# Patient Record
Sex: FEMALE | Employment: FULL TIME | URBAN - METROPOLITAN AREA
[De-identification: names, ages, dates, MRNs, and addresses within clinical notes are randomized per-mention and may not be internally consistent; named-entity substitution may affect disease eponyms.]

---

## 2022-04-13 ENCOUNTER — HOSPITAL ENCOUNTER (OUTPATIENT)
Dept: PHYSICAL THERAPY | Age: 32
Discharge: HOME | End: 2022-04-13
Payer: COMMERCIAL

## 2022-04-13 PROCEDURE — 97530 THERAPEUTIC ACTIVITIES: CPT

## 2022-04-13 PROCEDURE — 97161 PT EVAL LOW COMPLEX 20 MIN: CPT

## 2022-04-13 NOTE — PROGRESS NOTES
PF Daily Treatment Note  Patient Name: Uyen Frankel  Date:2022  []  Patient  Verified  Insurance:Payor: Fanta Estes / Plan: [facility] [address] / Product Type: PPO /   In time:5:15  Out time: 5:52  Total Treatment Time (min): 37  Total Timed Codes (min): 23  1:1 Treatment Time ( only): 40   Visit #: 1 of -    Treatment Area: [x] Pelvic Floor     [] Other:  SUBJECTIVE  Pain Level (0-10 scale): 3/10  Any medication changes, allergies to medications, adverse drug reactions, diagnosis change, or new procedure performed?: [x] No    [] Yes (see summary sheet for update)    Ms. Uyen Frankel is a 35 y/o, F who present with c/o stress UI, PFD/pelvic pain. Pain/problem started after childbirths ( & ). Pt reports prolonged childbirths and significant leakage, PF pain right after childbirth. Pt reports more of a pressure, some episodes of sharp pain duirng ovulation time. Pt reports daily leakage post voiding, couples times a day during the time of ovulation. Min-mod leakage with exercise/lifiting. Pt had history of constipation, straining and hemorrhoid. Hemorrhoid aggravated with childbirth. She denies any bowel problem or pain with sexual relations at this time. Pt also notes some weakness with TA during exercises. Pelvic Floor Dysfunction Evaluation    Musculoskeletal Screen:    Skin Integrity:  [] Healthy [] Red  [] Labia Atrophy [] Fragile    Sensation: [] Intact [] Diminished:    Muscle Bulk: [] Symmetrical  [] Well-developed [] Atrophied:  []L   []R   []B    Prolapse: [] Cystocele:   [] Rectocele:    PERF Score (Performance/Endurance/Repetitions/Flicks)   P: E: R: F: Total:    Patient has failed previous pelvic floor muscle training?   [] Yes    [] No    EMG Evaluation:  [] N/A [] Deferred secondary to:    Channel A: Electrode type:  [] Internal    [] Surface    [] Vaginal    [] Rectal  Channel B: Electrode location:    Baseline Resting Tone (1 minute)  Channel A (microvolts): Quality: [] Normal [] Irradic [] Elevated  Channel B (microvolts): Slow Twitch: (10 second hold, 20 second rest)  Channel A (microvolts): Quality:[] Quick/slow rise [] Low net rise  Net rise (microvolts):   [] Slow Relaxation [] Incoordination       [] Unable to contract [] Fatigues at (sec):       [] Elevated baseline between contractions    Channel B (microvolts): Use of Accessory Muscles: [] Minimal  Net rise (microvolts):   [] Moderate  [] Excessive       [] No use of accessory muscles    Fast Twitch (3 second hold, 10 second rest)  Channel A (microvolts): Quality:[] Quick/slow rise [] Low net rise  Net rise (microvolts):   [] Slow Relaxation [] Incoordination       [] Unable to contract [] Fatigues at (sec):       [] Elevated baseline between contractions    Channel B (microvolts): Use of Accessory Muscles: [] Minimal  Net rise (microvolts):   [] Moderate  [] Excessive       [] No use of accessory muscles    Optional Tests:  Lower abdominal strength: /5    Comments/Additional Tests:      OBJECTIVE    14 min eval      23 min Therapeutic Activity:  []  See flow sheet :Pt edu within scope of practice on prognosis, POC, PFM anatomy/physiology, HEP review, healthy bowel & bladder function. Rationale: Increase pelvic floor muscle strength, Improve quality of pelvic floor contractions, Decrease resting tone of the pelvic floor, Increase tissue extensibility of the pelvic floor muscles, Increase core strength, Inhibit abnormal muscle activity and Improve lumbosacral and coccygeal mobility in order to Increase urinary continence, Improve ability to undergo a gynecological exam and Improve ability to perform ADLs.               min Patient Education: [x] Review HEP    [] Progressed/Changed HEP based on:   [] positioning   [] body mechanics   [] transfers   [] heat/ice application        Other Objective/Functional Measures:   []baseline resting tone: []slow twitch mms   []fast twitch mms      Pain Level (0-10 scale) post treatment: 0/10    ASSESSMENT/Changes in Function: see POC please    []  Decrease # of leaks   [] No change []  Improving [] Resolved     []  Decrease hypertonus [] No change []  Improving [] Resolved     []  Increase void interval [] No change []  Improving [] Resolved     []  Increase PF strength [] No change []  Improving [] Resolved     []  Increase PF endurance [] No change []  Improving [] Resolved     []  Increase endurance [] No change []  Improving [] Resolved     []  Decrease # of pads [] No change []  Improving [] Resolved     []  Decrease pain [] No change []  Improving [] Resolved       Patient will continue to benefit from skilled PT services to modify and progress therapeutic interventions, address functional mobility deficits, address ROM deficits, address strength deficits, analyze and address soft tissue restrictions, analyze and cue movement patterns, analyze and modify body mechanics/ergonomics, assess and modify postural abnormalities, address imbalance/dizziness and instruct in home and community integration to attain remaining goals.      [x]  See Plan of Care         PLAN  [x]  Upgrade activities as tolerated     [x]  Continue plan of care  []  Update interventions per flow sheet       []  Discharge due to:_  []  Other:_        Ally Purpura 4/13/2022  9:42 AM

## 2022-04-13 NOTE — PROGRESS NOTES
[facility] [employer] [facility]  [address] [place] [address]  [phone] fax    Plan of Care/ Statement of Necessity for Physical Therapy Services    Patient name: Sea Chicas Start of Care: 2022   Referral source: Shakeel Gonzalez DO : 1990    Medical Diagnosis: Pelvic and perineal pain [R10.2]  Stress incontinence (female) (male) [N39.3]  Payor: BLUE CROSS / Plan: [facility] [address] / Product Type: PPO /  Onset Date: aggravated several months ago    Treatment Diagnosis: PFD, stress UI   Prior Hospitalization: see medical history Provider#: [id number]   Medications: Verified on Patient summary List    Comorbidities: depression   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 Assessment/ beard information: Ms. Sea Chicas is a 35 y/o, F who present with c/o stress UI, PFD/pelvic pain. Pain/problem started after childbirths ( & 2018). Pt reports prolonged childbirths and significant leakage, PF pain right after childbirth. Pt reports more of a pressure, some episodes of sharp pain duirng ovulation/menstrual time. Pt reports daily leakage post voiding, couples times a day during the time of ovulation. Min-mod leakage with exercise/lifiting weight. Pt had history of constipation, straining and hemorrhoid. She denies any bowel problem or pain with sexual relations at this time. Pt also notes some weakness with TA during exercises.  Internal assessment held until next visit per patient request. Patient may benefit from physical therapy to address the above limitations to improve her QOL.     Evaluation Complexity History MEDIUM  Complexity : 1-2 comorbidities / personal factors will impact the outcome/ POC ; Examination LOW Complexity : 1-2 Standardized tests and measures addressing body structure, function, activity limitation and / or participation in recreation  ;Presentation LOW Complexity : Stable, uncomplicated  ;Clinical Decision Making MEDIUM Complexity : FOTO score of 26-74  Overall Complexity Rating: LOW     Problem List: Pelvic pain/dysfunction, Decreased pelvic floor mm awareness, Decreased pelvic floor mm strength, Use of accessory muscles, Improper voiding habits, Hypertonus of pelvic floor and Urinary urgency    Treatment Plan may include any combination of the following:   Therapeutic exercise, Urge suppression techniques, Neuromuscular re-education, Manual therapy, Physical agent/modality and Patient education  Patient / Family readiness to learn indicated by: asking questions, trying to perform skills and interest    Persons(s) to be included in education: patient (P)    Barriers to Learning/Limitations: None    Patient Goal (s): stable pelvic floor and core unit, no incontinence, increased performance    Patient Self Reported Health Status: excellent    Rehabilitation Potential: good    Short Term Goals: To be accomplished in 4 weeks:  1. Patient will demonstrate accurate performance of home exercise program/pelvic floor contractions as adjunct to physical therapy clinic visits to promote healthy lifestyle and improved quality of life. Eval status: good understanding     2. Patient will report at least 25% improvement with leakage after voiding for increased quality of life. Eval status: min leakage post voiding, worst during times of ovulation & menstrual cycles     3. Patient will reports at least 25% improvement with PF discomfort/pressure to improve her QOL  Eval status: 0-4/10, intermittent pain/pressure, worst with ovulation/menstrual cycles        Long Term Goals: To be accomplished in 8  weeks:  1. Patient will demonstrate independence in HEP for maintenance of pelvic floor program and improved quality of life. Eval status: good understanding     2.  Patient will have decreased leakage episodes to </=3 days per week for increased quality of life. Eval status: daily, min leakage post voiding, worst during times of ovulation & menstrual cycles     3. Patient will have increased pelvic floor muscle motor performance by 1/2 to 1 grade for improved urinary continence and quality of life. Eval status: ~2+/5 (deflecting urine stream)    4. Patient will reports at least 60% improvement with PF discomfort/pressure to improve her QOL  Eval status: 0-4/10, intermittent pain/pressure, worst with ovulation/menstrual cycles     5. Patient will have FOTO Urinary Problem score change of 7 points or more indicating improvement in function for icreased quality of life. Eval status: 57      Frequency / Duration: Patient to be seen 1-2 times per week for 8 weeks. Patient/ Caregiver education and instruction: Diagnosis, prognosis, Proper Voiding Habits, Diet, Pain Management, Exercises and Bladder Retraining      Puja Cardona 4/13/2022 9:42 AM    ________________________________________________________________________    I certify that the above Therapy Services are being furnished while the patient is under my care. I agree with the treatment plan and certify that this therapy is necessary.     [de-identified] Signature:____________Date:_________TIME:________     Morgan Rivas DO  ** Signature, Date and Time must be completed for valid certification **      Please sign and return to [facility] [employer] [facility]   [address]  [phone] fax

## 2022-05-03 ENCOUNTER — HOSPITAL ENCOUNTER (OUTPATIENT)
Dept: PHYSICAL THERAPY | Age: 32
Discharge: HOME | End: 2022-05-03
Payer: COMMERCIAL

## 2022-05-03 PROCEDURE — 97530 THERAPEUTIC ACTIVITIES: CPT

## 2022-05-03 PROCEDURE — 97112 NEUROMUSCULAR REEDUCATION: CPT

## 2022-05-10 ENCOUNTER — APPOINTMENT (OUTPATIENT)
Dept: PHYSICAL THERAPY | Age: 32
End: 2022-05-10
Payer: COMMERCIAL

## 2022-05-17 ENCOUNTER — HOSPITAL ENCOUNTER (OUTPATIENT)
Dept: PHYSICAL THERAPY | Age: 32
Discharge: HOME | End: 2022-05-17
Payer: COMMERCIAL

## 2022-05-17 PROCEDURE — 97112 NEUROMUSCULAR REEDUCATION: CPT

## 2022-05-17 PROCEDURE — 97530 THERAPEUTIC ACTIVITIES: CPT

## 2022-05-17 NOTE — PROGRESS NOTES
PF DAILY TREATMENT NOTE 3-16    Patient Name: Vito Ramirez  Date:2022  : 1990  [x]  Patient  Verified  Payor: Eugenio Cali / Plan: [facility] [employer] [address] / Product Type: PPO /    In time: 5:18  Out time: 6:19  Total Treatment Time (min): 61  Visit #: 3 of     Medicare/BCBS Only   Total Timed Codes (min):  56 1:1 Treatment Time:  56     Treatment Area: [x] Pelvic Floor     [] Other:    SUBJECTIVE  Pain Level (0-10 scale): 0/10  Any medication changes, allergies to medications, adverse drug reactions, diagnosis change, or new procedure performed?: [x] No    [] Yes (see summary sheet for update)  Subjective functional status/changes:   [] No changes reported  Pt reports feeling like a pop with pelvic wand MFR, no pain, no other adverse effect though. She has multiple urinary leakage during her menstrual cycles but she's ok at this time. OBJECTIVE      31 min Therapeutic Activity:  []  See flow sheet :    []  Increase Tissue extensibility        [x]  Assess fiber intake    [x]  Assess voiding habits  [x]  Assess bowel habits  [x]  Other:   Rationale: Increase pelvic floor muscle strength, Improve quality of pelvic floor contractions, Decrease resting tone of the pelvic floor, Increase tissue extensibility of the pelvic floor muscles, Increase core strength, Inhibit abnormal muscle activity and Improve lumbosacral and coccygeal mobility in order to Increase urinary continence, Improve ability to engage in sexual intercourse and Improve ability to perform ADLs.       25 min Neuromuscular Re-education:  []  See flow sheet :   [x]  Pelvic floor strengthening                 []  Pelvic floor downtraining  [x]  Quality pelvic floor contractions       [x]  Relaxation techniques  []  Urge suppression exercises  []  Other:  Rationale:  Increase pelvic floor muscle strength, Improve quality of pelvic floor contractions, Decrease resting tone of the pelvic floor, Increase tissue extensibility of the pelvic floor muscles, Increase core strength, Inhibit abnormal muscle activity and Improve lumbosacral and coccygeal mobility in order to Increase urinary continence, Improve ability to engage in sexual intercourse and Improve ability to perform ADLs. With   [] TE   [] TA   [] neuro  [] manual   [] other: Patient Education: [x] Review HEP    [] Progressed/Changed HEP based on:   [] positioning   [] body mechanics   [] transfers   [] heat/ice application    [] other:      Other Objective/Functional Measures:   []baseline resting tone:   []slow twitch mms   []fast twitch mms    Pain Level (0-10 scale) post treatment: 0/10    ASSESSMENT/Changes in Function: see Progress Note please      []  Decrease # of leaks   [] No change []  Improving [] Resolved     []  Decrease hypertonus [] No change []  Improving [] Resolved     []  Increase void interval [] No change []  Improving [] Resolved     []  Increase PF strength [] No change []  Improving [] Resolved     []  Increase PF endurance [] No change []  Improving [] Resolved     []  Increase endurance [] No change []  Improving [] Resolved     []  Decrease # of pads [] No change []  Improving [] Resolved     []  Decrease pain [] No change []  Improving [] Resolved     []  Increased coordination [] No change []  Improving [] Resolved     []  Increased Bowel Frequency [] No change []  Improving [] Resolved       Patient will continue to benefit from skilled PT services to modify and progress therapeutic interventions, address functional mobility deficits, address ROM deficits, address strength deficits, analyze and address soft tissue restrictions, analyze and cue movement patterns, analyze and modify body mechanics/ergonomics, assess and modify postural abnormalities, address imbalance/dizziness and instruct in home and community integration to attain remaining goals.      []  See Plan of Care  [x]  See progress note/recertification  []  See Discharge Summary Progress towards goals / Updated goals:  Short Term Goals: To be accomplished in 4 weeks:  1. Patient will demonstrate accurate performance of home exercise program/pelvic floor contractions as adjunct to physical therapy clinic visits to promote healthy lifestyle and improved quality of life. Eval status: good understanding  Current: good compliance 5-17-22     2. Patient will report at least 25% improvement with leakage after voiding for increased quality of life. Eval status: min leakage post voiding, worst during times of ovulation & menstrual cycles  Current: progressing gradually 5-17-22    3. Patient will reports at least 25% improvement with PF discomfort/pressure to improve her QOL  Eval status: 0-4/10, intermittent pain/pressure, worst with ovulation/menstrual cycles  Current: progressing gradually 5-17-22       Long Term Goals: To be accomplished in 8  weeks:  1. Patient will demonstrate independence in HEP for maintenance of pelvic floor program and improved quality of life. Eval status: good understanding  Current: good compliance 5-17-22    2. Patient will have decreased leakage episodes to </=3 days per week for increased quality of life. Eval status: daily, min leakage post voiding, worst during times of ovulation & menstrual cycles  Current: progressing gradually 5-17-22    3. Patient will have increased pelvic floor muscle motor performance by 1/2 to 1 grade for improved urinary continence and quality of life. Eval status: ~2+/5 (deflecting urine stream)  Current: progressing gradually 5-17-22    4. Patient will reports at least 60% improvement with PF discomfort/pressure to improve her QOL  Eval status: 0-4/10, intermittent pain/pressure, worst with ovulation/menstrual cycles  Current: progressing gradually 5-17-22    5. Patient will have FOTO Urinary Problem score change of 7 points or more indicating improvement in function for icreased quality of life.   Eval status: 57  Current: improved 3 points 5-17-22      PLAN  [x]  Upgrade activities as tolerated     [x]  Continue plan of care  []  Update interventions per flow sheet       []  Discharge due to:_  []  Other:_      Aixa Avilez 5/17/2022  9:40 AM    Future Appointments   Date Time Provider Omid Healy   5/17/2022  5:15 PM Deatra Parkinson [id number]   5/24/2022  5:15 PM Deatra Parkinson [id number]   5/31/2022  5:15 PM Deatra Parkinson [id number] [facility]

## 2022-05-17 NOTE — PROGRESS NOTES
[facility]  [address] [facility]  [phone] fax    Pelvic Floor Progress Note    Patient name: Jarrett Purvis Start of Care: 2022   Referral source: Morgan Rivas DO : 1990               Medical Diagnosis: Pelvic and perineal pain [R10.2]  Stress incontinence (female) (male) [N39.3]  Payor: BLUE CROSS / Plan: 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 Patient will demonstrate accurate performance of home exercise program/pelvic floor contractions as adjunct to physical therapy clinic visits to promote healthy lifestyle and improved quality of life. Status at Progress Note: good compliance 22     2. Patient will report at least 25% improvement with leakage after voiding for increased quality of life. Status at Progress Note: min leakage post voiding, worst during times of ovulation & menstrual cycles; progressing gradually 22     3.  Patient will reports at least 25% improvement with PF discomfort/pressure to improve her QOL  Status at Progress Note: 0-4/10, intermittent pain/pressure, worst with ovulation/menstrual cycles; progressing gradually 5-17-22        Long Term Goals: To be accomplished in 6 weeks:  1. Patient will demonstrate independence in HEP for maintenance of pelvic floor program and improved quality of life. Status at Progress Note: good compliance 5-17-22     2. Patient will have decreased leakage episodes to </=3 days per week for increased quality of life. Status at Progress Note: daily, min leakage post voiding, worst during times of ovulation & menstrual cycles; progressing gradually 5-17-22     3. Patient will have increased pelvic floor muscle motor performance by 1/2 to 1 grade for improved urinary continence and quality of life. Status at Progress Note: ~2+/5 (deflecting urine stream); progressing gradually 5-17-22     4. Patient will reports at least 60% improvement with PF discomfort/pressure to improve her QOL  Status at Progress Note: 0-4/10, intermittent pain/pressure, worst with ovulation/menstrual cycles; progressing gradually 5-17-22     5. Patient will have FOTO Urinary Problem score change of 7 points or more indicating improvement in function for icreased quality of life. Status at Progress Note: initial assessment 57; improved 3 points 5-17-22    ASSESSMENT/RECOMMENDATIONS: Pt demonstrates limited progress, partially due to few visits (lmited appointments and personal schedule). Pt present with elevated tone of PFM, mod discomfort with levator ani, fair strength of PFM, instability of ant wall and uterus/cervix. Pt cont to experience urinary leakage with mod activities during ovulation & menstruation. She demonstrates good understanding & compliance with HEP, self management & pelvic wand use.  Pt would cont to benefit from skilled PF PT to Increase pelvic floor muscle strength, Improve quality of pelvic floor contractions, Decrease resting tone of the pelvic floor, Increase tissue extensibility of the pelvic floor muscles, Increase core strength, Inhibit abnormal muscle activity and Improve lumbosacral and coccygeal mobility in order to Increase urinary continence, Improve ability to engage in sexual intercourse and Improve ability to perform ADLs. [x]Continue therapy per initial plan/protocol at a frequency of  1-2 x per week for 6 weeks  []Continue therapy with the following recommended changes:_____________________      _____________________________________________________________________  []Discontinue therapy progressing towards or have reached established goals  []Discontinue therapy due to lack of appreciable progress towards goals  []Discontinue therapy due to lack of attendance or compliance  []Await Physician's recommendations/decisions regarding therapy  []Other:________________________________________________________________    Thank you for this referral.   Carmon Sandifer 5/17/2022 9:41 AM  NOTE TO PHYSICIAN:  PLEASE COMPLETE THE ORDERS BELOW AND   FAX TO [facility]: [phone]  If you are unable to process this request in 24 hours please contact our office: [phone] I have read the above report and request that my patient continue as recommended. ? I have read the above report and request that my patient continue therapy with the following changes/special instructions:___________________________________________________________  ? I have read the above report and request that my patient be discharged from therapy.     [de-identified] Signature:____________Date:_________TIME:________     [place] Mourning, DO  ** Signature, Date and Time must be completed for valid certification **

## 2022-05-24 ENCOUNTER — TELEPHONE (OUTPATIENT)
Dept: PHYSICAL THERAPY | Age: 32
End: 2022-05-24

## 2022-05-24 ENCOUNTER — HOSPITAL ENCOUNTER (OUTPATIENT)
Dept: PHYSICAL THERAPY | Age: 32
End: 2022-05-24
Payer: COMMERCIAL

## 2022-05-31 ENCOUNTER — HOSPITAL ENCOUNTER (OUTPATIENT)
Dept: PHYSICAL THERAPY | Age: 32
Discharge: HOME | End: 2022-05-31
Payer: COMMERCIAL

## 2022-05-31 PROCEDURE — 97110 THERAPEUTIC EXERCISES: CPT

## 2022-05-31 PROCEDURE — 97530 THERAPEUTIC ACTIVITIES: CPT

## 2022-05-31 NOTE — PROGRESS NOTES
PF DAILY TREATMENT NOTE 3-16    Patient Name: Eva Silver  Date:2022  : 1990  [x]  Patient  Verified  Payor: Rola Dumont / Plan: [facility] [employer] [address] / Product Type: PPO /    In time: 5:15  Out time: 6:10  Total Treatment Time (min): 55  Visit #: 1 of 8    Medicare/BCBS Only   Total Timed Codes (min):  55 1:1 Treatment Time:  55     Treatment Area: [x] Pelvic Floor     [] Other:    SUBJECTIVE  Pain Level (0-10 scale): 7/10 SI joint, Right is worst    Any medication changes, allergies to medications, adverse drug reactions, diagnosis change, or new procedure performed?: [x] No    [] Yes (see summary sheet for update)  Subjective functional status/changes:   [] No changes reported  Pt reports doing ok overall. OBJECTIVE      15 min Therapeutic Exercise:  [] See flow sheet :   []  Pelvic floor strengthening                 []  Pelvic floor downtraining  []  Quality pelvic floor contractions       []  Relaxation techniques  []  Urge suppression exercises  [x]  Other: hips & core strengthening  Rationale: Increase pelvic floor muscle strength, Improve quality of pelvic floor contractions, Decrease resting tone of the pelvic floor, Increase tissue extensibility of the pelvic floor muscles, Increase core strength, Inhibit abnormal muscle activity and Improve lumbosacral and coccygeal mobility in order to Increase urinary continence, Improve ability to undergo a gynecological exam and Improve ability to perform ADLs.          40 min Therapeutic Activity:  []  See flow sheet :    []  Increase Tissue extensibility        [x]  Assess fiber intake    [x]  Assess voiding habits  [x]  Assess bowel habits  [x]  Other: therawand use; SI belt   Rationale:  Increase pelvic floor muscle strength, Improve quality of pelvic floor contractions, Decrease resting tone of the pelvic floor, Increase tissue extensibility of the pelvic floor muscles, Increase core strength, Inhibit abnormal muscle activity and Improve lumbosacral and coccygeal mobility in order to Increase urinary continence, Improve ability to undergo a gynecological exam and Improve ability to perform ADLs. With   [] TE   [] TA   [] neuro  [] manual   [] other: Patient Education: [x] Review HEP    [] Progressed/Changed HEP based on:   [] positioning   [] body mechanics   [] transfers   [] heat/ice application    [] other:      Other Objective/Functional Measures:   []baseline resting tone:   []slow twitch mms   []fast twitch mms    Pain Level (0-10 scale) post treatment: 0/10    ASSESSMENT/Changes in Function: Pt found multiple trigger points along Right levator ani, piriformis, Left OI and 2nd layer of PFM. Pt reported good tolerance with pelvic wand use and significant pain relief after pelvic alignment correction (Right AI, Left on left sacral torsion). Will cont to progress as tolerated.      []  Decrease # of leaks   [] No change []  Improving [] Resolved     []  Decrease hypertonus [] No change []  Improving [] Resolved     []  Increase void interval [] No change []  Improving [] Resolved     []  Increase PF strength [] No change []  Improving [] Resolved     []  Increase PF endurance [] No change []  Improving [] Resolved     []  Increase endurance [] No change []  Improving [] Resolved     []  Decrease # of pads [] No change []  Improving [] Resolved     []  Decrease pain [] No change []  Improving [] Resolved     []  Increased coordination [] No change []  Improving [] Resolved     []  Increased Bowel Frequency [] No change []  Improving [] Resolved       Patient will continue to benefit from skilled PT services to modify and progress therapeutic interventions, address functional mobility deficits, address ROM deficits, address strength deficits, analyze and address soft tissue restrictions, analyze and cue movement patterns, analyze and modify body mechanics/ergonomics, assess and modify postural abnormalities, address imbalance/dizziness and instruct in home and community integration to attain remaining goals. []  See Plan of Care  [x]  See progress note/recertification  []  See Discharge Summary         Progress towards goals / Updated goals:  Short Term Goals: To be accomplished in 3 weeks:  1. Patient will demonstrate accurate performance of home exercise program/pelvic floor contractions as adjunct to physical therapy clinic visits to promote healthy lifestyle and improved quality of life. Status at Progress Note: good compliance 5-17-22  Current: good compliance 5-31-22     2. Patient will report at least 25% improvement with leakage after voiding for increased quality of life. Status at Progress Note: min leakage post voiding, worst during times of ovulation & menstrual cycles; progressing gradually 5-17-22     3. Patient will reports at least 25% improvement with PF discomfort/pressure to improve her QOL  Status at Progress Note: 0-4/10, intermittent pain/pressure, worst with ovulation/menstrual cycles; progressing gradually 5-17-22        Long Term Goals: To be accomplished in 6 weeks:  1. Patient will demonstrate independence in HEP for maintenance of pelvic floor program and improved quality of life. Status at Progress Note: good compliance 5-17-22     2. Patient will have decreased leakage episodes to </=3 days per week for increased quality of life. Status at Progress Note: daily, min leakage post voiding, worst during times of ovulation & menstrual cycles; progressing gradually 5-17-22     3. Patient will have increased pelvic floor muscle motor performance by 1/2 to 1 grade for improved urinary continence and quality of life. Status at Progress Note: ~2+/5 (deflecting urine stream); progressing gradually 5-17-22     4.  Patient will reports at least 60% improvement with PF discomfort/pressure to improve her QOL  Status at Progress Note: 0-4/10, intermittent pain/pressure, worst with ovulation/menstrual cycles; progressing gradually 5-17-22     5. Patient will have FOTO Urinary Problem score change of 7 points or more indicating improvement in function for icreased quality of life.   Status at Progress Note: initial assessment 57; improved 3 points 5-17-22       PLAN  [x]  Upgrade activities as tolerated     [x]  Continue plan of care  []  Update interventions per flow sheet       []  Discharge due to:_  []  Other:_      Governor Gretchen 5/31/2022  9:44 AM    Future Appointments   Date Time Provider Omid Healy   5/31/2022  5:15 PM John Byrnes [facility]

## 2022-06-06 ENCOUNTER — TELEPHONE (OUTPATIENT)
Dept: PHYSICAL THERAPY | Age: 32
End: 2022-06-06

## 2022-06-28 ENCOUNTER — TELEPHONE (OUTPATIENT)
Dept: PHYSICAL THERAPY | Age: 32
End: 2022-06-28

## 2022-08-24 NOTE — THERAPY DISCHARGE
[facility] [place] [employer] [facility]  [address]  [phone] fax    Physical Therapy Discharge Summary    Patient name: Katherin Kim Start of Care: 2022   Referral source: Frank Bejarano DO : 1990               Medical Diagnosis: Pelvic and perineal pain [R10.2]  Stress incontinence (female) (male) [N39.3]  Payor: BLUE CROSS / [facility] [address] / Product Type: PPO /  Onset Date: aggravated several months ago               Treatment Diagnosis: PFD, stress UI   Prior Hospitalization: see medical history Provider#: [id number]   Medications: Verified on Patient summary List    Comorbidities: depression   Prior Level of Function: ind with all mobility, 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 Patient will demonstrate accurate performance of home exercise program/pelvic floor contractions as adjunct to physical therapy clinic visits to promote healthy lifestyle and improved quality of life. Status at Progress Note: good compliance 22  Current: good compliance 22  Status at discharge: not met, unplanned discharge      2. Patient will report at least 25% improvement with leakage after voiding for increased quality of life. Status at Progress Note: min leakage post voiding, worst during times of ovulation & menstrual cycles; progressing gradually 22  Status at discharge: not met, unplanned discharge      3. Patient will reports at least 25% improvement with PF discomfort/pressure to improve her QOL  Status at Progress Note: 0-4/10, intermittent pain/pressure, worst with ovulation/menstrual cycles; progressing gradually 22  Status at discharge: not met, unplanned discharge         Long Term Goals: To be accomplished in 6 weeks:  1. Patient will demonstrate independence in HEP for maintenance of pelvic floor program and improved quality of life. Status at Progress Note: good compliance 5-17-22  Status at discharge: not met, unplanned discharge      2. Patient will have decreased leakage episodes to </=3 days per week for increased quality of life. Status at Progress Note: daily, min leakage post voiding, worst during times of ovulation & menstrual cycles; progressing gradually 5-17-22  Status at discharge: not met, unplanned discharge      3. Patient will have increased pelvic floor muscle motor performance by 1/2 to 1 grade for improved urinary continence and quality of life. Status at Progress Note: ~2+/5 (deflecting urine stream); progressing gradually 5-17-22  Status at discharge: not met, unplanned discharge      4. Patient will reports at least 60% improvement with PF discomfort/pressure to improve her QOL  Status at Progress Note: 0-4/10, intermittent pain/pressure, worst with ovulation/menstrual cycles; progressing gradually 5-17-22  Status at discharge: not met, unplanned discharge      5. Patient will have FOTO Urinary Problem score change of 7 points or more indicating improvement in function for icreased quality of life.   Status at Progress Note: initial assessment 62; improved 3 points 5-17-22  Status at discharge: not met, unplanned discharge       ASSESSMENT/RECOMMENDATIONS: Pt had ~1 month gap and didn't show for re-assessment to resume PT.     [x]Discontinue therapy: []Patient has reached or is progressing toward set goals      [x]Patient is non-compliant or has abdicated      []Due to lack of appreciable progress towards set goals    Eloisa Elias 8/24/2022 2:22 PM

## 2023-07-11 LAB
CHOLEST SERPL-MCNC: 160 MG/DL
GLUCOSE SERPL-MCNC: 91 MG/DL (ref 74–99)
HDLC SERPL-MCNC: 74 MG/DL (ref 40–60)
LDLC SERPL CALC-MCNC: 69.4 MG/DL (ref 0–100)
TRIGL SERPL-MCNC: 83 MG/DL

## 2024-01-30 ENCOUNTER — HOSPITAL ENCOUNTER (OUTPATIENT)
Facility: HOSPITAL | Age: 34
Discharge: HOME | End: 2024-02-02
Payer: COMMERCIAL

## 2024-01-30 ENCOUNTER — HOSPITAL ENCOUNTER (OUTPATIENT)
Facility: HOSPITAL | Age: 34
End: 2024-01-30
Payer: COMMERCIAL

## 2024-01-30 VITALS — HEIGHT: 65 IN | BODY MASS INDEX: 26.99 KG/M2 | WEIGHT: 162 LBS

## 2024-01-30 DIAGNOSIS — N64.4 PAIN OF BOTH BREASTS: ICD-10-CM

## 2024-01-30 PROCEDURE — 76642 ULTRASOUND BREAST LIMITED: CPT

## 2024-01-30 PROCEDURE — 77066 DX MAMMO INCL CAD BI: CPT

## 2024-01-30 PROCEDURE — G0279 TOMOSYNTHESIS, MAMMO: HCPCS

## 2024-09-17 SDOH — HEALTH STABILITY: PHYSICAL HEALTH: ON AVERAGE, HOW MANY DAYS PER WEEK DO YOU ENGAGE IN MODERATE TO STRENUOUS EXERCISE (LIKE A BRISK WALK)?: 3 DAYS

## 2024-09-17 SDOH — HEALTH STABILITY: PHYSICAL HEALTH: ON AVERAGE, HOW MANY MINUTES DO YOU ENGAGE IN EXERCISE AT THIS LEVEL?: 60 MIN

## 2024-09-20 ENCOUNTER — OFFICE VISIT (OUTPATIENT)
Facility: CLINIC | Age: 34
End: 2024-09-20

## 2024-09-20 VITALS
TEMPERATURE: 98.2 F | HEART RATE: 72 BPM | SYSTOLIC BLOOD PRESSURE: 122 MMHG | BODY MASS INDEX: 27.49 KG/M2 | RESPIRATION RATE: 20 BRPM | OXYGEN SATURATION: 98 % | WEIGHT: 165 LBS | DIASTOLIC BLOOD PRESSURE: 73 MMHG | HEIGHT: 65 IN

## 2024-09-20 DIAGNOSIS — Z13.220 NEED FOR LIPID SCREENING: ICD-10-CM

## 2024-09-20 DIAGNOSIS — Z13.1 SCREENING FOR DIABETES MELLITUS (DM): ICD-10-CM

## 2024-09-20 DIAGNOSIS — Z11.59 ENCOUNTER FOR HCV SCREENING TEST FOR LOW RISK PATIENT: Primary | ICD-10-CM

## 2024-09-20 DIAGNOSIS — Z13.29 THYROID DISORDER SCREENING: ICD-10-CM

## 2024-09-20 DIAGNOSIS — Z12.4 CERVICAL CANCER SCREENING: ICD-10-CM

## 2024-09-20 DIAGNOSIS — F32.A DEPRESSION, UNSPECIFIED DEPRESSION TYPE: ICD-10-CM

## 2024-09-20 DIAGNOSIS — Z11.4 ENCOUNTER FOR SCREENING FOR HIV: ICD-10-CM

## 2024-09-20 DIAGNOSIS — R53.83 OTHER FATIGUE: ICD-10-CM

## 2024-09-20 DIAGNOSIS — G43.909 MIGRAINE SYNDROME: ICD-10-CM

## 2024-09-20 PROBLEM — R10.2 PAIN IN FEMALE PELVIS: Status: ACTIVE | Noted: 2024-09-20

## 2024-09-20 PROBLEM — Z34.90 PREGNANCY: Status: ACTIVE | Noted: 2018-08-17

## 2024-09-20 PROBLEM — H93.12 TINNITUS, LEFT: Status: ACTIVE | Noted: 2024-09-20

## 2024-09-20 PROBLEM — J30.2 SEASONAL ALLERGIES: Status: ACTIVE | Noted: 2024-09-20

## 2024-09-20 PROBLEM — J30.9 ALLERGIC RHINITIS: Status: ACTIVE | Noted: 2019-02-07

## 2024-09-20 PROBLEM — G44.219 EPISODIC TENSION-TYPE HEADACHE, NOT INTRACTABLE: Status: ACTIVE | Noted: 2024-09-20

## 2024-09-20 PROBLEM — N39.3 STRESS INCONTINENCE IN FEMALE: Status: ACTIVE | Noted: 2024-09-20

## 2024-09-20 PROBLEM — G44.209 TENSION TYPE HEADACHE: Status: ACTIVE | Noted: 2020-03-10

## 2024-09-20 PROBLEM — Z86.59 HISTORY OF ADHD: Status: ACTIVE | Noted: 2024-09-20

## 2024-09-20 PROBLEM — R39.15 URINARY URGENCY: Status: ACTIVE | Noted: 2020-02-19

## 2024-09-20 PROBLEM — F90.9 ADHD (ATTENTION DEFICIT HYPERACTIVITY DISORDER): Status: ACTIVE | Noted: 2024-09-20

## 2024-09-20 PROBLEM — H93.13 TINNITUS, BILATERAL: Status: ACTIVE | Noted: 2024-09-20

## 2024-09-20 PROBLEM — M79.18 MYOFASCIAL PAIN: Status: ACTIVE | Noted: 2020-03-10

## 2024-09-20 PROBLEM — M99.01 SOMATIC DYSFUNCTION OF CERVICAL REGION: Status: ACTIVE | Noted: 2020-03-10

## 2024-09-20 RX ORDER — BUPROPION HYDROCHLORIDE 150 MG/1
150 TABLET ORAL EVERY MORNING
Qty: 90 TABLET | Refills: 3 | Status: SHIPPED | OUTPATIENT
Start: 2024-09-20

## 2024-09-20 RX ORDER — BUPROPION HYDROCHLORIDE 150 MG/1
150 TABLET ORAL EVERY MORNING
Qty: 90 TABLET | Refills: 1 | Status: SHIPPED | OUTPATIENT
Start: 2024-09-20 | End: 2024-09-20

## 2024-09-20 RX ORDER — SUMATRIPTAN 50 MG/1
50 TABLET, FILM COATED ORAL DAILY PRN
Qty: 9 TABLET | Refills: 5 | Status: CANCELLED | OUTPATIENT
Start: 2024-09-20

## 2024-09-20 RX ORDER — LEVONORGESTREL 52 MG/1
INTRAUTERINE DEVICE INTRAUTERINE
COMMUNITY

## 2024-09-20 RX ORDER — SUMATRIPTAN 50 MG/1
TABLET, FILM COATED ORAL
COMMUNITY

## 2024-09-20 RX ORDER — BUPROPION HYDROCHLORIDE 150 MG/1
TABLET ORAL
COMMUNITY
Start: 2024-06-23 | End: 2024-09-20 | Stop reason: SDUPTHER

## 2024-09-20 SDOH — ECONOMIC STABILITY: FOOD INSECURITY: WITHIN THE PAST 12 MONTHS, YOU WORRIED THAT YOUR FOOD WOULD RUN OUT BEFORE YOU GOT MONEY TO BUY MORE.: NEVER TRUE

## 2024-09-20 SDOH — ECONOMIC STABILITY: INCOME INSECURITY: HOW HARD IS IT FOR YOU TO PAY FOR THE VERY BASICS LIKE FOOD, HOUSING, MEDICAL CARE, AND HEATING?: NOT HARD AT ALL

## 2024-09-20 SDOH — ECONOMIC STABILITY: FOOD INSECURITY: WITHIN THE PAST 12 MONTHS, THE FOOD YOU BOUGHT JUST DIDN'T LAST AND YOU DIDN'T HAVE MONEY TO GET MORE.: NEVER TRUE

## 2024-09-20 ASSESSMENT — PATIENT HEALTH QUESTIONNAIRE - PHQ9
SUM OF ALL RESPONSES TO PHQ QUESTIONS 1-9: 2
SUM OF ALL RESPONSES TO PHQ QUESTIONS 1-9: 2
9. THOUGHTS THAT YOU WOULD BE BETTER OFF DEAD, OR OF HURTING YOURSELF: NOT AT ALL
7. TROUBLE CONCENTRATING ON THINGS, SUCH AS READING THE NEWSPAPER OR WATCHING TELEVISION: SEVERAL DAYS
3. TROUBLE FALLING OR STAYING ASLEEP: NOT AT ALL
10. IF YOU CHECKED OFF ANY PROBLEMS, HOW DIFFICULT HAVE THESE PROBLEMS MADE IT FOR YOU TO DO YOUR WORK, TAKE CARE OF THINGS AT HOME, OR GET ALONG WITH OTHER PEOPLE: NOT DIFFICULT AT ALL
SUM OF ALL RESPONSES TO PHQ QUESTIONS 1-9: 2
1. LITTLE INTEREST OR PLEASURE IN DOING THINGS: NOT AT ALL
SUM OF ALL RESPONSES TO PHQ9 QUESTIONS 1 & 2: 0
6. FEELING BAD ABOUT YOURSELF - OR THAT YOU ARE A FAILURE OR HAVE LET YOURSELF OR YOUR FAMILY DOWN: NOT AT ALL
5. POOR APPETITE OR OVEREATING: NOT AT ALL
2. FEELING DOWN, DEPRESSED OR HOPELESS: NOT AT ALL
4. FEELING TIRED OR HAVING LITTLE ENERGY: SEVERAL DAYS
8. MOVING OR SPEAKING SO SLOWLY THAT OTHER PEOPLE COULD HAVE NOTICED. OR THE OPPOSITE, BEING SO FIGETY OR RESTLESS THAT YOU HAVE BEEN MOVING AROUND A LOT MORE THAN USUAL: NOT AT ALL
SUM OF ALL RESPONSES TO PHQ QUESTIONS 1-9: 2

## 2024-09-24 LAB
CHOLEST SERPL-MCNC: 196 MG/DL
GLUCOSE SERPL-MCNC: 96 MG/DL (ref 74–99)
HDLC SERPL-MCNC: 84 MG/DL (ref 40–60)
LDLC SERPL CALC-MCNC: 94.6 MG/DL (ref 0–100)
TRIGL SERPL-MCNC: 87 MG/DL

## 2024-10-16 PROBLEM — J34.2 DEVIATED SEPTUM: Status: ACTIVE | Noted: 2024-10-16

## 2024-10-20 PROBLEM — Z12.4 CERVICAL CANCER SCREENING: Status: RESOLVED | Noted: 2024-09-20 | Resolved: 2024-10-20

## 2025-03-15 DIAGNOSIS — F32.A DEPRESSION, UNSPECIFIED DEPRESSION TYPE: ICD-10-CM

## 2025-03-17 ENCOUNTER — HOSPITAL ENCOUNTER (OUTPATIENT)
Facility: HOSPITAL | Age: 35
Setting detail: SPECIMEN
Discharge: HOME | End: 2025-03-20

## 2025-03-17 LAB — LABCORP SPECIMEN COLLECTION: NORMAL

## 2025-03-17 PROCEDURE — 99001 SPECIMEN HANDLING PT-LAB: CPT

## 2025-03-17 RX ORDER — BUPROPION HYDROCHLORIDE 150 MG/1
150 TABLET ORAL EVERY MORNING
Qty: 90 TABLET | Refills: 1 | OUTPATIENT
Start: 2025-03-17

## 2025-03-17 SDOH — ECONOMIC STABILITY: INCOME INSECURITY: IN THE LAST 12 MONTHS, WAS THERE A TIME WHEN YOU WERE NOT ABLE TO PAY THE MORTGAGE OR RENT ON TIME?: NO

## 2025-03-17 SDOH — ECONOMIC STABILITY: FOOD INSECURITY: WITHIN THE PAST 12 MONTHS, YOU WORRIED THAT YOUR FOOD WOULD RUN OUT BEFORE YOU GOT MONEY TO BUY MORE.: NEVER TRUE

## 2025-03-17 SDOH — ECONOMIC STABILITY: TRANSPORTATION INSECURITY
IN THE PAST 12 MONTHS, HAS THE LACK OF TRANSPORTATION KEPT YOU FROM MEDICAL APPOINTMENTS OR FROM GETTING MEDICATIONS?: NO

## 2025-03-17 SDOH — ECONOMIC STABILITY: FOOD INSECURITY: WITHIN THE PAST 12 MONTHS, THE FOOD YOU BOUGHT JUST DIDN'T LAST AND YOU DIDN'T HAVE MONEY TO GET MORE.: NEVER TRUE

## 2025-03-17 SDOH — ECONOMIC STABILITY: TRANSPORTATION INSECURITY
IN THE PAST 12 MONTHS, HAS LACK OF TRANSPORTATION KEPT YOU FROM MEETINGS, WORK, OR FROM GETTING THINGS NEEDED FOR DAILY LIVING?: NO

## 2025-03-18 LAB
CHOLEST SERPL-MCNC: 152 MG/DL (ref 100–199)
HBA1C MFR BLD: 5.2 % (ref 4.8–5.6)
HDLC SERPL-MCNC: 66 MG/DL
LDLC SERPL CALC-MCNC: 73 MG/DL (ref 0–99)
TRIGL SERPL-MCNC: 64 MG/DL (ref 0–149)
VLDLC SERPL CALC-MCNC: 13 MG/DL (ref 5–40)

## 2025-03-19 ENCOUNTER — RESULTS FOLLOW-UP (OUTPATIENT)
Facility: CLINIC | Age: 35
End: 2025-03-19

## 2025-03-20 ENCOUNTER — OFFICE VISIT (OUTPATIENT)
Facility: CLINIC | Age: 35
End: 2025-03-20

## 2025-03-20 VITALS
HEIGHT: 65 IN | RESPIRATION RATE: 18 BRPM | OXYGEN SATURATION: 96 % | DIASTOLIC BLOOD PRESSURE: 71 MMHG | BODY MASS INDEX: 28.82 KG/M2 | TEMPERATURE: 97.7 F | HEART RATE: 68 BPM | SYSTOLIC BLOOD PRESSURE: 114 MMHG | WEIGHT: 173 LBS

## 2025-03-20 DIAGNOSIS — E66.3 OVERWEIGHT (BMI 25.0-29.9): Primary | ICD-10-CM

## 2025-03-20 DIAGNOSIS — F32.A DEPRESSION, UNSPECIFIED DEPRESSION TYPE: ICD-10-CM

## 2025-03-20 RX ORDER — BUPROPION HYDROCHLORIDE 150 MG/1
150 TABLET ORAL EVERY MORNING
Qty: 90 TABLET | Refills: 3 | Status: SHIPPED | OUTPATIENT
Start: 2025-03-20

## 2025-03-20 NOTE — PROGRESS NOTES
Kareen Estrella is a 34 y.o.  female and presents with    Chief Complaint   Patient presents with    Depression    Migraine           Subjective:  Patient presents to the clinic for a follow up appointment. She is doing well on the Wellbutrin and will need a refill. She does not need to use the Imitrex anymore. She can't remember the last time she had a migraine.    For past few month has had swelling near her neck/clavicle. It is not painful or red. She has a h/o trauma to the left shoulder when she was doing dumbbell swings 5 years ago and heard a pop at her AC joint. No pain since then.    She is worried about an 8 lb weight since last September. She is a  so she works out a lot. She eats lots of protein and vegetables. Eats things in balance. Denies constipation, diarrhea, or fatigue.     Her last pap smear was last Monday.           ROS   Dermatological ROS: positive for - swelling    All other systems reviewed and are negative.      Objective:  Vitals:    03/20/25 1314   BP: 114/71   Pulse: 68   Resp: 18   Temp: 97.7 °F (36.5 °C)   SpO2: 96%         alert, well appearing, and in no distress  General appearance - alert, well appearing, and in no distress  Chest - clear to auscultation, no wheezes, rales or rhonchi, symmetric air entry  Heart - normal rate, regular rhythm, normal S1, S2, no murmurs, rubs, clicks or gallops  Skin - normal coloration and turgor, no rashes, no suspicious skin lesions noted, skin is slightly raised in the left supraclavicular region        LABS     TESTS      Assessment/Plan:    1. Depression, unspecified depression type  Stable  Continue Wellbutrin  - buPROPion (WELLBUTRIN XL) 150 MG extended release tablet; Take 1 tablet by mouth every morning  Dispense: 90 tablet; Refill: 3    2. Overweight (BMI 25.0-29.9)  Reassurance that muscle mass probably contributed to any weight gain.     #3.  Migraines  No longer needing to use the Imitrex.    Up-to-date with Pap

## 2025-05-15 ENCOUNTER — HOSPITAL ENCOUNTER (OUTPATIENT)
Facility: HOSPITAL | Age: 35
Setting detail: SPECIMEN
Discharge: HOME | End: 2025-05-18
Payer: COMMERCIAL

## 2025-05-15 ENCOUNTER — OFFICE VISIT (OUTPATIENT)
Facility: CLINIC | Age: 35
End: 2025-05-15
Payer: COMMERCIAL

## 2025-05-15 VITALS
HEIGHT: 65 IN | WEIGHT: 169 LBS | OXYGEN SATURATION: 98 % | SYSTOLIC BLOOD PRESSURE: 118 MMHG | BODY MASS INDEX: 28.16 KG/M2 | RESPIRATION RATE: 18 BRPM | HEART RATE: 77 BPM | TEMPERATURE: 97.3 F | DIASTOLIC BLOOD PRESSURE: 81 MMHG

## 2025-05-15 DIAGNOSIS — R05.2 SUBACUTE COUGH: ICD-10-CM

## 2025-05-15 DIAGNOSIS — R05.2 SUBACUTE COUGH: Primary | ICD-10-CM

## 2025-05-15 PROCEDURE — 99213 OFFICE O/P EST LOW 20 MIN: CPT | Performed by: FAMILY MEDICINE

## 2025-05-15 PROCEDURE — 36415 COLL VENOUS BLD VENIPUNCTURE: CPT

## 2025-05-15 PROCEDURE — 86615 BORDETELLA ANTIBODY: CPT

## 2025-05-15 SDOH — ECONOMIC STABILITY: FOOD INSECURITY: WITHIN THE PAST 12 MONTHS, YOU WORRIED THAT YOUR FOOD WOULD RUN OUT BEFORE YOU GOT MONEY TO BUY MORE.: NEVER TRUE

## 2025-05-15 SDOH — ECONOMIC STABILITY: FOOD INSECURITY: WITHIN THE PAST 12 MONTHS, THE FOOD YOU BOUGHT JUST DIDN'T LAST AND YOU DIDN'T HAVE MONEY TO GET MORE.: NEVER TRUE

## 2025-05-15 ASSESSMENT — PATIENT HEALTH QUESTIONNAIRE - PHQ9
7. TROUBLE CONCENTRATING ON THINGS, SUCH AS READING THE NEWSPAPER OR WATCHING TELEVISION: NOT AT ALL
SUM OF ALL RESPONSES TO PHQ QUESTIONS 1-9: 0
10. IF YOU CHECKED OFF ANY PROBLEMS, HOW DIFFICULT HAVE THESE PROBLEMS MADE IT FOR YOU TO DO YOUR WORK, TAKE CARE OF THINGS AT HOME, OR GET ALONG WITH OTHER PEOPLE: NOT DIFFICULT AT ALL
6. FEELING BAD ABOUT YOURSELF - OR THAT YOU ARE A FAILURE OR HAVE LET YOURSELF OR YOUR FAMILY DOWN: NOT AT ALL
SUM OF ALL RESPONSES TO PHQ QUESTIONS 1-9: 0
4. FEELING TIRED OR HAVING LITTLE ENERGY: NOT AT ALL
3. TROUBLE FALLING OR STAYING ASLEEP: NOT AT ALL
SUM OF ALL RESPONSES TO PHQ QUESTIONS 1-9: 0
1. LITTLE INTEREST OR PLEASURE IN DOING THINGS: NOT AT ALL
2. FEELING DOWN, DEPRESSED OR HOPELESS: NOT AT ALL
8. MOVING OR SPEAKING SO SLOWLY THAT OTHER PEOPLE COULD HAVE NOTICED. OR THE OPPOSITE, BEING SO FIGETY OR RESTLESS THAT YOU HAVE BEEN MOVING AROUND A LOT MORE THAN USUAL: NOT AT ALL
5. POOR APPETITE OR OVEREATING: NOT AT ALL
9. THOUGHTS THAT YOU WOULD BE BETTER OFF DEAD, OR OF HURTING YOURSELF: NOT AT ALL
SUM OF ALL RESPONSES TO PHQ QUESTIONS 1-9: 0

## 2025-05-19 LAB
B PERT IGG SER-ACNC: 6.46 INDEX (ref 0–0.94)
B PERT IGM SER QL IA: 1.9 INDEX (ref 0–0.9)

## 2025-05-22 ENCOUNTER — RESULTS FOLLOW-UP (OUTPATIENT)
Facility: CLINIC | Age: 35
End: 2025-05-22

## 2025-05-22 DIAGNOSIS — A37.90 PERTUSSIS: Primary | ICD-10-CM

## 2025-05-22 RX ORDER — AZITHROMYCIN 250 MG/1
TABLET, FILM COATED ORAL
Qty: 6 TABLET | Refills: 0 | Status: SHIPPED | OUTPATIENT
Start: 2025-05-22 | End: 2025-06-01

## 2025-05-31 ASSESSMENT — ENCOUNTER SYMPTOMS
COUGH: 1
WHEEZING: 1
SHORTNESS OF BREATH: 1

## 2025-07-11 LAB
CHOLEST SERPL-MCNC: 167 MG/DL
GLUCOSE SERPL-MCNC: 92 MG/DL (ref 74–108)
HDLC SERPL-MCNC: 64 MG/DL (ref 40–60)
LDLC SERPL CALC-MCNC: 85 MG/DL (ref 0–100)
TRIGL SERPL-MCNC: 91 MG/DL (ref 0–150)